# Patient Record
Sex: FEMALE
[De-identification: names, ages, dates, MRNs, and addresses within clinical notes are randomized per-mention and may not be internally consistent; named-entity substitution may affect disease eponyms.]

---

## 2020-05-08 ENCOUNTER — NURSE TRIAGE (OUTPATIENT)
Dept: OTHER | Facility: CLINIC | Age: 45
End: 2020-05-08

## 2020-05-08 NOTE — TELEPHONE ENCOUNTER
Reason for Disposition   Mild-moderate headache   Large amount of blood has been lost (e.g., one cup)    Answer Assessment - Initial Assessment Questions  1. LOCATION: \"Where does it hurt? \"       Back of head primarily but some sides and front  2. ONSET: \"When did the headache start? \" (Minutes, hours or days)       Last night after a nose bleed  3. PATTERN: \"Does the pain come and go, or has it been constant since it started? \"      Constant   4. SEVERITY: \"How bad is the pain? \" and \"What does it keep you from doing? \"  (e.g., Scale 1-10; mild, moderate, or severe)    - MILD (1-3): doesn't interfere with normal activities     - MODERATE (4-7): interferes with normal activities or awakens from sleep     - SEVERE (8-10): excruciating pain, unable to do any normal activities         4-5/10  5. RECURRENT SYMPTOM: \"Have you ever had headaches before? \" If so, ask: \"When was the last time? \" and \"What happened that time? \"       Yes when she was a kid, after a nose bleed  6. CAUSE: \"What do you think is causing the headache? \"      Post nosebleed  7. MIGRAINE: \"Have you been diagnosed with migraine headaches? \" If so, ask: \"Is this headache similar? \"       No  8. HEAD INJURY: \"Has there been any recent injury to the head? \"       No  9. OTHER SYMPTOMS: \"Do you have any other symptoms? \" (fever, stiff neck, eye pain, sore throat, cold symptoms)      Bloody nose last night  10. PREGNANCY: \"Is there any chance you are pregnant? \" \"When was your last menstrual period? \"        No    Answer Assessment - Initial Assessment Questions  1. AMOUNT OF BLEEDING: \"How bad is the bleeding? \" \"How much blood was lost?\" \"Has the bleeding stopped? \"    - MILD: needed a couple tissues    - MODERATE: needed many tissues    - SEVERE: large blood clots, soaked many tissues, lasted more than 30 minutes       severe  2. ONSET: \"When did the nosebleed start? \"       Last night lasting 45 minutes  3.  FREQUENCY: \"How many nosebleeds have you had in the